# Patient Record
Sex: FEMALE | Race: WHITE | URBAN - METROPOLITAN AREA
[De-identification: names, ages, dates, MRNs, and addresses within clinical notes are randomized per-mention and may not be internally consistent; named-entity substitution may affect disease eponyms.]

---

## 2017-01-04 ENCOUNTER — THERAPY VISIT (OUTPATIENT)
Dept: AUDIOLOGY | Facility: CLINIC | Age: 62
End: 2017-01-04

## 2017-01-04 DIAGNOSIS — R42 DIZZINESS AND GIDDINESS: Primary | ICD-10-CM

## 2017-01-04 NOTE — PROGRESS NOTES
"AUDIOLOGY REPORT    BACKGROUND INFORMATION: Adela Mills, 62 year old, was seen in Audiology at the [facility] on 1/4/2017, for videonystagmography (VNG) and rotational chair testing, referred by Nathaly Davis MD. The patient reports that she first experienced dizziness in May of 2016. She felt a spinning sensation when she tried to get out of bed in the morning. Her daughter is in graduate school for Physical Therapy and apparently her and her supervisor completed a Ana Maria-Hallpike which was positive to the left. She then underwent Epley treatments with benefit. At some point her daughter reports that her BPPV was then positive for horizontal canal . Adela then underwent treatment  with benefit. In July she reports feeling pain in her jaw accompanied by a tingling and numbness sensation along her jaw line. Adela was also experiencing some word finding trouble so went to the Emergency Department. She reports she had a CT and MRI which was negative for stroke. She was diagnosed with vestibular neuritis with [place] Palsey. Since that time she has had some improvement in her dizziness but still reports symptoms.Adela reports she also has some significant neck issues and pain  to a fall down the stairs when she was younger. Adela reports while undergoing PT that they \"held her head and it made her dizzy\". She denies any known neurologic concerns, hx of headaches, or any other major medical conditions. Hearing evaluations have not been completed to date. The patient has not taken any antivestibular medications in the past 48 hours.    TEST RESULTS AND PROCEDURES:    Dizziness Handicap Inventory (DHI): 78/100 severe perceived impairment    Rotational chair testing:   Sinusoidal harmonic acceleration test:    Spontaneous nystagmus: Absent  Phase: Normal at 0.01, 0.02, 0.04, 0.08,  Essentially WNL at 0.16 and 0.32 Hz  Gain: Normal at 0.01, 0.02, 0.04, 0.08, Essentially WNL at 0.16 and " 0.32 Hz  Symmetry: Normal at 0.01, 0.02, 0.04, 0.08, 0.16 and 0.32 Hz  Spectral Purity: Normal at 0.01, 0.02, 0.04, 0.08, 0.16 and 0.32 Hz  Overall rotational chair test: Normal at 0.01, 0.02, 0.04, 0.08, 0.16 and 0.32 Hz    Slight abnormal gain and phase at 0.32 and 0.16 Hz, which is likely due to head slippage and considered non-significant    Videonystagmography (VNG) testing:  Tympanograms: normal eardrum mobility bilaterally  Ocular range of motion and ocular counter roll: Normal  Head Thrust: Negative  Gaze nystagmus with fixation: Negative   Saccades: Normal  Anti-saccades: Normal  Pursuit: Normal  Optokinetics: normal  Gaze with fixation removed: 1-2 deg/sec right beating nystagmus in center and right (non-significant)  Head Shake test: Negative for nystagmus and symptoms    Lamar-Hallpike Head Right: Negative for nystagmus & symptoms   Lamar-Hallpike Head Left: Negative for nystagmus & symptoms   Roll Test: Negative for nystagmus & symptoms bilaterally    Positionals: Supine: Negative  Positionals: Body Right: 1-2 deg/sec LB  Positionals: Body Left: Negative  Positionals: Pre-Caloric: Negative    Calorics (Tested at 44 degrees and 30 degrees Celsius for 30 seconds for warm and cool water, respectively):  Right Warm Eye Speed: 58 degrees per second right beating  Left Warm Eye Speed: 72 degrees per second left beating  Right Cool Eye Speed: 23 degrees per second left beating  Left Cool Eye Speed: 29 degrees per second right beating  Difference between ear: 11% right hypofunction. (Greater than 25% considered clinically significant.)  Fixation Index: 0.06  Overall caloric test: normal    SUMMARY AND RECOMMENDATIONS:   1) There were no indications of central vestibular system involvement noted on today's exam.     2) There were no indications of peripheral vestibular system involvement noted on today's exam.     Follow-up with Dr. Nathaly Davis for medical management.  Please call this clinic at [phone] with  questions regarding these results or recommendations.         Asael Luna, [facility]  Licensed Audiologist  [place] #[id number]

## 2017-01-09 ENCOUNTER — OFFICE VISIT (OUTPATIENT)
Dept: AUDIOLOGY | Facility: CLINIC | Age: 62
End: 2017-01-09

## 2017-01-09 ENCOUNTER — OFFICE VISIT (OUTPATIENT)
Dept: OTOLARYNGOLOGY | Facility: CLINIC | Age: 62
End: 2017-01-09

## 2017-01-09 VITALS — WEIGHT: 203 LBS | BODY MASS INDEX: 33.82 KG/M2 | HEIGHT: 65 IN

## 2017-01-09 DIAGNOSIS — H90.3 SENSORINEURAL HEARING LOSS, BILATERAL: Primary | ICD-10-CM

## 2017-01-09 DIAGNOSIS — R42 DIZZINESS: Primary | ICD-10-CM

## 2017-01-09 RX ORDER — FEXOFENADINE HCL 180 MG/1
TABLET ORAL
COMMUNITY
Start: 2000-12-05

## 2017-01-09 RX ORDER — BUDESONIDE AND FORMOTEROL FUMARATE DIHYDRATE 160; 4.5 UG/1; UG/1
AEROSOL RESPIRATORY (INHALATION)
COMMUNITY
Start: 2015-12-15

## 2017-01-09 ASSESSMENT — PAIN SCALES - GENERAL: PAINLEVEL: NO PAIN (0)

## 2017-01-09 NOTE — PROGRESS NOTES
Adela Mills is seen in consultation from Dr. Rivera.  She is a 62 year old female being seen for dizziness.  She had an acute onset of vertigo in July.  This was accompanied by a left facial pain and weakness.  She was seen in the ED and they ruled out a stroke and was diagnosed with Bell's palsy and vestibular neuronitis and given valcyclovir and prednisone.  Her daughter was a PT student at the time and did some Epley manuevers on her which helped somewhat.  She was then seen by one of her daughter's instructors at [employer] who did some testing and did not think it was BPPV but did see abnormalities.  She does not know what exactly those abnormalities were or what the instructor thought it might be.  She reports that she continues to have a sense of imbalance, particularly with bending over and turning quickly.  She does not feel safe enough to drive because rapid head movements make her imbalanced.  She has significant difficulty with visual stimuli as well including scrolling on the computer and really cannot go to the grocery store because of all the visual stimuli.  She has been doing vestibular therapy locally and she reports that they've actually given her some videos simulating driving which initially she had a lot of difficulty with but is getting easier to watch.  She has not had any true vertigo for some time.    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  She really hasn't noticed any hearing loss.  No history of ear problems in the past.    Past Medical History   Diagnosis Date     Anxiety      vertigo     Thyroid disease      Uncomplicated asthma      exercise induced asthma     Benign positional vertigo      Chronic sinusitis        No past surgical history on file.    Family History   Problem Relation Age of Onset     CANCER Sister      Depression Sister      DIABETES Father      HEART DISEASE Father      Hypertension  Mother      Hypertension Father      Hypertension Sister      Hypertension Brother      Hypertension Brother      CEREBROVASCULAR DISEASE Father      Thyroid Disease Sister      Thyroid Disease Brother                      Social History   Substance Use Topics     Smoking status: Never Smoker      Smokeless tobacco: Never Used     Alcohol Use: 0.0 oz/week       Patient Supplied Answers to Review of Systems   ENT ROS 1/9/2017   Neurology Dizzy spells   Psychology Frequently feeling anxious   Gastrointestinal/Genitourinary Diarrhea   Musculoskeletal Neck pain   The remainder of the 10 point review of systems is otherwise negative.    Physical examination:  Constitutional:  In no acute distress, appears stated age  Eyes:  Extraocular movements intact, no spontaneous nystagmus  Ears:  Both ears examined under the microscope.  Ear canals clear, TMs intact with aerated middle ears.  Respiratory:  No increased work of breathing, wheezing or stridor  Musculoskeletal:  Good upper extremity strength  Skin:  No rashes on the head and neck  Neurologic:  House Brackman 1/6 bilaterally, ambulating normally  Psychiatric:  Alert, normal affect, answering questions appropriately    Audiogram:  Very mild high frequency sensorineural hearing loss bilaterally with 100% speech discrimination, normal tympanograms and intact reflexes.    Balance testing:  VNG and rotary chair were done.  VNG and rotary chair were normal with no indications of either peripheral or central findings.    Outside records were reviewed including clinic and therapy notes.  CT head and MRI brain were reviewed and noted to be normal.    Assessment and plan:  Continued imbalance after an acute episode of vertigo in July associated with left facial weakness.  The vertigo has resolved but she is left with residual imbalance and intolerance to visual stimuli.  We discussed that this is likely her brain functionally having a difficult time compensating for the acute  insult in July.  Her inability to tolerate strong visual stimuli is also central in nature.  The recommendation is continued visual and vestibular therapy.  I will check with one of our vestibular therapists about a visual therapist since that is such a strong trigger for her.  She and her  had their questions answered.

## 2017-01-09 NOTE — NURSING NOTE
Chief Complaint   Patient presents with     Consult     dizziness, Bell's palsy     Devyn Garcia LPN

## 2017-01-09 NOTE — PROGRESS NOTES
AUDIOLOGY REPORT    SUMMARY: Audiology visit completed. See audiogram for results.      RECOMMENDATIONS: Follow-up with ENT.    Simin Lopez  Licensed Audiologist  [place] License #[id number]

## 2017-01-09 NOTE — Clinical Note
1/9/2017       RE: Adela Mills  [address]  [place] [address]     Dear Colleague,    Thank you for referring your patient, Adela Mills, to the [facility] E[facility] at [facility]. Please see a copy of my visit note below.    Adela Mills is seen in consultation from Dr. Rivera.  She is a 62 year old female being seen for dizziness.  She had an acute onset of vertigo in July.  This was accompanied by a left facial pain and weakness.  She was seen in the ED and they ruled out a stroke and was diagnosed with Bell's palsy and vestibular neuronitis and given valcyclovir and prednisone.  Her daughter was a PT student at the time and did some Epley manuevers on her which helped somewhat.  She was then seen by one of her daughter's instructors at [employer] who did some testing and did not think it was BPPV but did see abnormalities.  She does not know what exactly those abnormalities were or what the instructor thought it might be.  She reports that she continues to have a sense of imbalance, particularly with bending over and turning quickly.  She does not feel safe enough to drive because rapid head movements make her imbalanced.  She has significant difficulty with visual stimuli as well including scrolling on the computer and really cannot go to the grocery store because of all the visual stimuli.  She has been doing vestibular therapy locally and she reports that they've actually given her some videos simulating driving which initially she had a lot of difficulty with but is getting easier to watch.  She has not had any true vertigo for some time.    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  She really hasn't noticed any hearing loss.  No history of ear problems in the past.    Past Medical History   Diagnosis Date     Anxiety      vertigo     Thyroid disease      Uncomplicated asthma       exercise induced asthma     Benign positional vertigo      Chronic sinusitis        No past surgical history on file.    Family History   Problem Relation Age of Onset     CANCER Sister      Depression Sister      DIABETES Father      HEART DISEASE Father      Hypertension Mother      Hypertension Father      Hypertension Sister      Hypertension Brother      Hypertension Brother      CEREBROVASCULAR DISEASE Father      Thyroid Disease Sister      Thyroid Disease Brother                      Social History   Substance Use Topics     Smoking status: Never Smoker      Smokeless tobacco: Never Used     Alcohol Use: 0.0 oz/week       Patient Supplied Answers to Review of Systems   ENT ROS 1/9/2017   Neurology Dizzy spells   Psychology Frequently feeling anxious   Gastrointestinal/Genitourinary Diarrhea   Musculoskeletal Neck pain   The remainder of the 10 point review of systems is otherwise negative.    Physical examination:  Constitutional:  In no acute distress, appears stated age  Eyes:  Extraocular movements intact, no spontaneous nystagmus  Ears:  Both ears examined under the microscope.  Ear canals clear, TMs intact with aerated middle ears.  Respiratory:  No increased work of breathing, wheezing or stridor  Musculoskeletal:  Good upper extremity strength  Skin:  No rashes on the head and neck  Neurologic:  House Brackman 1/6 bilaterally, ambulating normally  Psychiatric:  Alert, normal affect, answering questions appropriately    Audiogram:  Very mild high frequency sensorineural hearing loss bilaterally with 100% speech discrimination, normal tympanograms and intact reflexes.    Balance testing:  VNG and rotary chair were done.  VNG and rotary chair were normal with no indications of either peripheral or central findings.    Outside records were reviewed including clinic and therapy notes.  CT head and MRI brain were reviewed and noted to be normal.    Assessment and plan:  Continued imbalance after an acute  episode of vertigo in July associated with left facial weakness.  The vertigo has resolved but she is left with residual imbalance and intolerance to visual stimuli.  We discussed that this is likely her brain functionally having a difficult time compensating for the acute insult in July.  Her inability to tolerate strong visual stimuli is also central in nature.  The recommendation is continued visual and vestibular therapy.  I will check with one of our vestibular therapists about a visual therapist since that is such a strong trigger for her.  She and her  had their questions answered.    Again, thank you for allowing me to participate in the care of your patient.      Sincerely,    Nathaly Davis MD

## 2017-01-16 ENCOUNTER — CARE COORDINATION (OUTPATIENT)
Dept: OTOLARYNGOLOGY | Facility: CLINIC | Age: 62
End: 2017-01-16

## 2017-01-16 NOTE — PROGRESS NOTES
Primary Ins:  TWAN  MRN:  [id number]  MyChart:  Active  Next Appt:  None    1-16-17 pt notified and given below information and understood.--------- Tatianna Carlisle RN    Message  Received: Today       Nathaly Davis MD Young, Barbara, PT Cc: Tatianna Carlisle RN                   Thank you!     Tatianna, would you be able to pass this along to Adela?  They live a ways away but I believe they have family in town.     Nathaly            Previous Messages       ----- Message -----      From: Tawanna Mckee, PT      Sent: 1/15/2017   6:27 AM        To: Nathaly Davis MD     Hi!     1. [email or web address] is DR Julio Raymond. He is a super nice man. [phone]   2.[email or web address] is in [place] and [place]. They do a great job also. [phone]     It would be worth it to at least do an assessment at either place. luis   ----- Message -----      From: Nathaly Davis MD      Sent: 1/13/2017   3:05 PM        To: Tawanna Mckee, PT     Hi.  This lady is sensitive to visual stimuli and I know you know a visual therapist in [facility].  I did tell them that she was often not covered by insurance and they are still interested in at least contacting her.  Do you have her info?  Thanks.     Nathaly

## 2017-06-03 ENCOUNTER — HEALTH MAINTENANCE LETTER (OUTPATIENT)
Age: 62
End: 2017-06-03

## 2018-10-18 ENCOUNTER — OFFICE VISIT (OUTPATIENT)
Dept: OTOLARYNGOLOGY | Facility: CLINIC | Age: 63
End: 2018-10-18
Payer: COMMERCIAL

## 2018-10-18 VITALS
DIASTOLIC BLOOD PRESSURE: 82 MMHG | SYSTOLIC BLOOD PRESSURE: 134 MMHG | BODY MASS INDEX: 34.07 KG/M2 | HEART RATE: 58 BPM | OXYGEN SATURATION: 96 % | WEIGHT: 202 LBS

## 2018-10-18 DIAGNOSIS — D10.30 PAPILLOMA OF ORAL CAVITY: Primary | ICD-10-CM

## 2018-10-18 PROCEDURE — 99213 OFFICE O/P EST LOW 20 MIN: CPT | Performed by: OTOLARYNGOLOGY

## 2018-10-18 RX ORDER — ASPIRIN 81 MG/1
81 TABLET ORAL
COMMUNITY
Start: 2018-05-24

## 2018-10-18 RX ORDER — LEVOTHYROXINE SODIUM 25 UG/1
25 TABLET ORAL
COMMUNITY
Start: 2018-10-03

## 2018-10-18 RX ORDER — ALBUTEROL SULFATE 90 UG/1
1-2 AEROSOL, METERED RESPIRATORY (INHALATION)
COMMUNITY
Start: 2018-04-05

## 2018-10-18 RX ORDER — HYDROCHLOROTHIAZIDE 25 MG/1
25 TABLET ORAL
COMMUNITY
Start: 2018-10-02

## 2018-10-18 NOTE — PROGRESS NOTES
History of Present Illness - Adela Mills is a 63 year old female here to see me for the first time for a lesion in the throat.  She tells me that she has allergies and clears her throat regularly.  And it was noted by her dentist months ago, and again 4 months later, and it was suggested she see an ENT.    She does not report any pain the throat, no change in swallowing solid or liquid.  There is a slight hoarseness in her throat as well.  She is on symbicort and allegra as well.    As a side note she was seen by Dr. Nathaly Davis at [facility] for sudden LEFT hearing loss and Bell's Palsy.  All scans were normal.  No definitive diagnosis has yet been made.    Past Medical History - There is no problem list on file for this patient.      Current Medications -   Current Outpatient Prescriptions:      albuterol (PROAIR HFA/PROVENTIL HFA/VENTOLIN HFA) 108 (90 Base) MCG/ACT inhaler, Inhale 1-2 puffs into the lungs, Disp: , Rfl:      aspirin 81 MG EC tablet, Take 81 mg by mouth, Disp: , Rfl:      budesonide-formoterol (SYMBICORT) 160-4.5 MCG/ACT Inhaler, , Disp: , Rfl:      fexofenadine (ALLEGRA ALLERGY) 180 MG tablet, , Disp: , Rfl:      hydrochlorothiazide (HYDRODIURIL) 25 MG tablet, Take 25 mg by mouth, Disp: , Rfl:      levothyroxine (SYNTHROID/LEVOTHROID) 25 MCG tablet, Take 25 mcg by mouth, Disp: , Rfl:      calcium carbonate 600 mg-vitamin D 400 units (CALTRATE) 600-400 MG-UNIT per tablet, Take 1 tablet by mouth, Disp: , Rfl:     Allergies -   Allergies   Allergen Reactions     Levofloxacin Anaphylaxis     Quinolones Anaphylaxis     Seasonal Shortness Of Breath     Prednisone      Other reaction(s): Psychosis  From prednisone pills       Social History -   Social History     Social History     Marital status:      Spouse name: N/A     Number of children: N/A     Years of education: N/A     Social History Main Topics     Smoking status: Never Smoker     Smokeless tobacco: Never Used     Alcohol use 0.0 oz/week       Comment: 2 glasses of wine per week     Drug use: No     Sexual activity: Not Currently     Partners: Female     Birth control/ protection: Post-menopausal     Other Topics Concern     None     Social History Narrative       Family History -   Family History   Problem Relation Age of Onset     Cancer Sister      Depression Sister      Hypertension Sister      Thyroid Disease Sister      Diabetes Father      HEART DISEASE Father      Hypertension Father      Cerebrovascular Disease Father      Hypertension Mother      Hypertension Brother      Hypertension Brother      Thyroid Disease Brother        Review of Systems - As per HPI and PMHx, otherwise 10+ system review of the head and neck, and general constitution is negative.    Physical Exam  B/P: 134/82, T: Data Unavailable, P: 58, R: Data Unavailable  Vitals: /82 (BP Location: Right arm, Cuff Size: Adult Large)  Pulse 58  Wt 91.6 kg (202 lb)  SpO2 96%  BMI 34.07 kg/m2  BMI= Body mass index is 34.07 kg/(m^2).    General - The patient is well nourished and well developed, and appears to have good nutritional status.  Alert and oriented to person and place, answers questions and cooperates with examination appropriately.   Head and Face - Normocephalic and atraumatic, with no gross asymmetry noted of the contour of the facial features.  The facial nerve is intact, with strong symmetric movements.  Voice and Breathing - The patient was breathing comfortably without the use of accessory muscles. There was no wheezing, stridor, or stertor.  The patients voice was clear and strong, and had appropriate pitch and quality.  Ears - The tympanic membranes are normal in appearance, bony landmarks are intact.  No retraction, perforation, or masses.  No fluid or purulence was seen in the external canal or the middle ear. No evidence of infection of the middle ear or external canal, cerumen was normal in appearance.  Eyes - Extraocular movements intact, and the pupils  were reactive to light.  Sclera were not icteric or injected, conjunctiva were pink and moist.  Mouth - Examination of the oral cavity showed pink, healthy oral mucosa. No lesions or ulcerations noted.  The tongue was mobile and midline, and the dentition were in good condition.    Throat - the lesion in question was immediately noted.  There is a 2-3mm perfectly circular pedunculated piece of tissue growing off of the base of the uvula, at the junction with the anterior tonsillar pillar, to the LEFT of midline.  No ulceration or erythema, and the surrounding mucosa is normal. Otherwise, the walls of the oropharynx were smooth, pink, moist, symmetric, and had no lesions or ulcerations.  The tonsillar pillars and soft palate were symmetric.  The uvula was midline on elevation.    Neck - Normal midline excursion of the laryngotracheal complex during swallowing.  Full range of motion on passive movement.  Palpation of the occipital, submental, submandibular, internal jugular chain, and supraclavicular nodes did not demonstrate any abnormal lymph nodes or masses.  The carotid pulse was palpable bilaterally.  Palpation of the thyroid was soft and smooth, with no nodules or goiter appreciated.  The trachea was mobile and midline.  Nose - External contour is symmetric, no gross deflection or scars.  Nasal mucosa is pink and moist with no abnormal mucus.  The septum was midline and non-obstructive, turbinates of normal size and position.  No polyps, masses, or purulence noted on examination.      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  She will watch it, and return if there is any growth or ulceration.  Certainly, I am willing to biopsy it as well as a clinic procedure, but I would not hurry into that, as sometimes that can trigger even more voluminous growth and spread.

## 2018-10-18 NOTE — PATIENT INSTRUCTIONS
Scheduling Information  To schedule your CT/MRI scan, please contact [facility] at [phone] OR [facility] at [phone]    To schedule your Surgery, please contact our Specialty Schedulers at [phone]      ENT Clinic Locations Clinic Hours Telephone Number     [facility]  [address] [place] [address]   Monday:           1:00pm -- 5:00pm    Friday:              8:00am - 12:00pm   To schedule/reschedule an appointment with   Dr. Archer,   please contact our   Specialty Scheduling Department at:     [phone]       [facility]  [address] [place] [address] Tuesday:          8:00am -- 2:00pm         Urgent Care Locations Clinic Hours Telephone Numbers     [facility]  [address] [place] [address]     Monday-Friday:     11:00am - 9:00pm    Saturday-Sunday:  9:00am - 5:00pm   [phone]     [facility]  [address] [place] [address]     Monday-Friday:      5:00pm - 9:00pm     Saturday-Sunday:  9:00am - 5:00pm   [phone]

## 2018-10-18 NOTE — MR AVS SNAPSHOT
After Visit Summary   10/18/2018    Adela Mills    MRN: [id number]           Patient Information     Date Of Birth          1955        Visit Information        Provider Department      10/18/2018 4:45 PM Len Archer MD [facility]        Today's Diagnoses     Papilloma of oral cavity    -  1      Care Instructions    Scheduling Information  To schedule your CT/MRI scan, please contact [facility] at [phone] OR [facility] at [phone]    To schedule your Surgery, please contact our Specialty Schedulers at [phone]      ENT Clinic Locations Clinic Hours Telephone Number     [facility]  [address] [place] [address]   Monday:           1:00pm -- 5:00pm    Friday:              8:00am - 12:00pm   To schedule/reschedule an appointment with   Dr. Archer,   please contact our   Specialty Scheduling Department at:     [phone]       [facility]  [address]  [place] [address] Tuesday:          8:00am -- 2:00pm         Urgent Care Locations Clinic Hours Telephone Numbers     [facility]  [address]  [place] [address]     Monday-Friday:     11:00am - 9:00pm    Saturday-Sunday:  9:00am - 5:00pm   [phone]     [facility]  [address] [place] [address]     Monday-Friday:      5:00pm - 9:00pm     Saturday-Sunday:  9:00am - 5:00pm   [phone]                 Follow-ups after your visit        Who to contact     If you have questions or need follow up information about today's clinic visit or your schedule please contact [facility] directly at [phone].  Normal or non-critical lab and imaging results will be communicated to you by MyChart, letter or phone within 4 business days after the clinic has received the results. If you do not hear from us within 7 days, please contact the clinic through MyChart or phone. If you have a critical or abnormal lab result,  we will notify you by phone as soon as possible.  Submit refill requests through [employer] or call your pharmacy and they will forward the refill request to us. Please allow 3 business days for your refill to be completed.          Additional Information About Your Visit        [employer] Information     [employer] gives you secure access to your electronic health record. If you see a primary care provider, you can also send messages to your care team and make appointments. If you have questions, please call your primary care clinic.  If you do not have a primary care provider, please call [phone] and they will assist you.        Care EveryWhere ID     This is your Care EveryWhere ID. This could be used by other organizations to access your [place] medical records  [id number]        Your Vitals Were     Pulse Pulse Oximetry BMI (Body Mass Index)             58 96% 34.07 kg/m2          Blood Pressure from Last 3 Encounters:   10/18/18 134/82    Weight from Last 3 Encounters:   10/18/18 91.6 kg (202 lb)   01/09/17 92.1 kg (203 lb)              Today, you had the following     No orders found for display       Primary Care Provider Office Phone # Fax #    Ana Paula Griggs [phone]       [facility] [place] [address]  [place] [address]        Equal Access to Services     CARLIN ZIEGLER : Hadii aad ku hadasho Soomaali, waaxda luqadaha, qaybta kaalmada adeegyada, waxay ann-mariein haymemen kavita hensley laabel . So [facility] [phone].    ATENCIÓN: Si habla español, tiene a cardenas disposición servicios gratuitos de asistencia lingüística. Llame al [phone].    We comply with applicable federal civil rights laws and [place] laws. We do not discriminate on the basis of race, color, national origin, age, disability, sex, sexual orientation, or gender identity.            Thank you!     Thank you for choosing [facility]  for your care. Our goal is always to provide you with excellent care. Hearing back  from our patients is one way we can continue to improve our services. Please take a few minutes to complete the written survey that you may receive in the mail after your visit with us. Thank you!             Your Updated Medication List - Protect others around you: Learn how to safely use, store and throw away your medicines at www.disposemymeds.org.          This list is accurate as of 10/18/18  4:47 PM.  Always use your most recent med list.                   Brand Name Dispense Instructions for use Diagnosis    albuterol 108 (90 Base) MCG/ACT inhaler    PROAIR HFA/PROVENTIL HFA/VENTOLIN HFA     Inhale 1-2 puffs into the lungs        ALLEGRA ALLERGY 180 MG tablet   Generic drug:  fexofenadine           aspirin 81 MG EC tablet      Take 81 mg by mouth        calcium carbonate 600 mg-vitamin D 400 units 600-400 MG-UNIT per tablet    CALTRATE     Take 1 tablet by mouth        hydrochlorothiazide 25 MG tablet    HYDRODIURIL     Take 25 mg by mouth        levothyroxine 25 MCG tablet    SYNTHROID/LEVOTHROID     Take 25 mcg by mouth        SYMBICORT 160-4.5 MCG/ACT Inhaler   Generic drug:  budesonide-formoterol

## 2018-10-18 NOTE — LETTER
10/18/2018         RE: Adela Mills  [address]  [place] [address]        Dear Colleague,    Thank you for referring your patient, Adela Mills, to the [facility]. Please see a copy of my visit note below.    History of Present Illness - Adela Mills is a 63 year old female here to see me for the first time for a lesion in the throat.  She tells me that she has allergies and clears her throat regularly.  And it was noted by her dentist months ago, and again 4 months later, and it was suggested she see an ENT.    She does not report any pain the throat, no change in swallowing solid or liquid.  There is a slight hoarseness in her throat as well.  She is on symbicort and allegra as well.    As a side note she was seen by Dr. Nathaly Davis at [facility] for sudden LEFT hearing loss and Bell's Palsy.  All scans were normal.  No definitive diagnosis has yet been made.    Past Medical History - There is no problem list on file for this patient.      Current Medications -   Current Outpatient Prescriptions:      albuterol (PROAIR HFA/PROVENTIL HFA/VENTOLIN HFA) 108 (90 Base) MCG/ACT inhaler, Inhale 1-2 puffs into the lungs, Disp: , Rfl:      aspirin 81 MG EC tablet, Take 81 mg by mouth, Disp: , Rfl:      budesonide-formoterol (SYMBICORT) 160-4.5 MCG/ACT Inhaler, , Disp: , Rfl:      fexofenadine (ALLEGRA ALLERGY) 180 MG tablet, , Disp: , Rfl:      hydrochlorothiazide (HYDRODIURIL) 25 MG tablet, Take 25 mg by mouth, Disp: , Rfl:      levothyroxine (SYNTHROID/LEVOTHROID) 25 MCG tablet, Take 25 mcg by mouth, Disp: , Rfl:      calcium carbonate 600 mg-vitamin D 400 units (CALTRATE) 600-400 MG-UNIT per tablet, Take 1 tablet by mouth, Disp: , Rfl:     Allergies -   Allergies   Allergen Reactions     Levofloxacin Anaphylaxis     Quinolones Anaphylaxis     Seasonal Shortness Of Breath     Prednisone      Other reaction(s): Psychosis  From prednisone pills       Social History -   Social History     Social History     Marital  status:      Spouse name: N/A     Number of children: N/A     Years of education: N/A     Social History Main Topics     Smoking status: Never Smoker     Smokeless tobacco: Never Used     Alcohol use 0.0 oz/week      Comment: 2 glasses of wine per week     Drug use: No     Sexual activity: Not Currently     Partners: Female     Birth control/ protection: Post-menopausal     Other Topics Concern     None     Social History Narrative       Family History -   Family History   Problem Relation Age of Onset     Cancer Sister      Depression Sister      Hypertension Sister      Thyroid Disease Sister      Diabetes Father      HEART DISEASE Father      Hypertension Father      Cerebrovascular Disease Father      Hypertension Mother      Hypertension Brother      Hypertension Brother      Thyroid Disease Brother        Review of Systems - As per HPI and PMHx, otherwise 10+ system review of the head and neck, and general constitution is negative.    Physical Exam  B/P: 134/82, T: Data Unavailable, P: 58, R: Data Unavailable  Vitals: /82 (BP Location: Right arm, Cuff Size: Adult Large)  Pulse 58  Wt 91.6 kg (202 lb)  SpO2 96%  BMI 34.07 kg/m2  BMI= Body mass index is 34.07 kg/(m^2).    General - The patient is well nourished and well developed, and appears to have good nutritional status.  Alert and oriented to person and place, answers questions and cooperates with examination appropriately.   Head and Face - Normocephalic and atraumatic, with no gross asymmetry noted of the contour of the facial features.  The facial nerve is intact, with strong symmetric movements.  Voice and Breathing - The patient was breathing comfortably without the use of accessory muscles. There was no wheezing, stridor, or stertor.  The patients voice was clear and strong, and had appropriate pitch and quality.  Ears - The tympanic membranes are normal in appearance, bony landmarks are intact.  No retraction, perforation, or masses.   No fluid or purulence was seen in the external canal or the middle ear. No evidence of infection of the middle ear or external canal, cerumen was normal in appearance.  Eyes - Extraocular movements intact, and the pupils were reactive to light.  Sclera were not icteric or injected, conjunctiva were pink and moist.  Mouth - Examination of the oral cavity showed pink, healthy oral mucosa. No lesions or ulcerations noted.  The tongue was mobile and midline, and the dentition were in good condition.    Throat - the lesion in question was immediately noted.  There is a 2-3mm perfectly circular pedunculated piece of tissue growing off of the base of the uvula, at the junction with the anterior tonsillar pillar, to the LEFT of midline.  No ulceration or erythema, and the surrounding mucosa is normal. Otherwise, the walls of the oropharynx were smooth, pink, moist, symmetric, and had no lesions or ulcerations.  The tonsillar pillars and soft palate were symmetric.  The uvula was midline on elevation.    Neck - Normal midline excursion of the laryngotracheal complex during swallowing.  Full range of motion on passive movement.  Palpation of the occipital, submental, submandibular, internal jugular chain, and supraclavicular nodes did not demonstrate any abnormal lymph nodes or masses.  The carotid pulse was palpable bilaterally.  Palpation of the thyroid was soft and smooth, with no nodules or goiter appreciated.  The trachea was mobile and midline.  Nose - External contour is symmetric, no gross deflection or scars.  Nasal mucosa is pink and moist with no abnormal mucus.  The septum was midline and non-obstructive, turbinates of normal size and position.  No polyps, masses, or purulence noted on examination.      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  She will watch it, and  return if there is any growth or ulceration.  Certainly, I am willing to biopsy it as well as a clinic procedure, but I would not hurry into that, as sometimes that can trigger even more voluminous growth and spread.        Again, thank you for allowing me to participate in the care of your patient.        Sincerely,        Len Archer MD

## 2019-09-29 ENCOUNTER — HEALTH MAINTENANCE LETTER (OUTPATIENT)
Age: 64
End: 2019-09-29

## 2020-03-15 ENCOUNTER — HEALTH MAINTENANCE LETTER (OUTPATIENT)
Age: 65
End: 2020-03-15

## 2021-01-14 ENCOUNTER — HEALTH MAINTENANCE LETTER (OUTPATIENT)
Age: 66
End: 2021-01-14

## 2021-05-09 ENCOUNTER — HEALTH MAINTENANCE LETTER (OUTPATIENT)
Age: 66
End: 2021-05-09

## 2021-10-24 ENCOUNTER — HEALTH MAINTENANCE LETTER (OUTPATIENT)
Age: 66
End: 2021-10-24

## 2022-06-05 ENCOUNTER — HEALTH MAINTENANCE LETTER (OUTPATIENT)
Age: 67
End: 2022-06-05

## 2022-10-15 ENCOUNTER — HEALTH MAINTENANCE LETTER (OUTPATIENT)
Age: 67
End: 2022-10-15

## 2023-06-11 ENCOUNTER — HEALTH MAINTENANCE LETTER (OUTPATIENT)
Age: 68
End: 2023-06-11

## 2023-10-29 ENCOUNTER — HEALTH MAINTENANCE LETTER (OUTPATIENT)
Age: 68
End: 2023-10-29